# Patient Record
Sex: FEMALE | Race: WHITE | NOT HISPANIC OR LATINO | URBAN - METROPOLITAN AREA
[De-identification: names, ages, dates, MRNs, and addresses within clinical notes are randomized per-mention and may not be internally consistent; named-entity substitution may affect disease eponyms.]

---

## 2022-07-09 ENCOUNTER — TELEPHONE ENCOUNTER (OUTPATIENT)
Dept: URBAN - METROPOLITAN AREA CLINIC 121 | Facility: CLINIC | Age: 77
End: 2022-07-09

## 2022-07-10 ENCOUNTER — TELEPHONE ENCOUNTER (OUTPATIENT)
Dept: URBAN - METROPOLITAN AREA CLINIC 121 | Facility: CLINIC | Age: 77
End: 2022-07-10

## 2022-07-30 ENCOUNTER — TELEPHONE ENCOUNTER (OUTPATIENT)
Age: 77
End: 2022-07-30

## 2022-07-31 ENCOUNTER — TELEPHONE ENCOUNTER (OUTPATIENT)
Age: 77
End: 2022-07-31

## 2022-08-08 ENCOUNTER — OFFICE VISIT (OUTPATIENT)
Dept: URBAN - METROPOLITAN AREA CLINIC 63 | Facility: CLINIC | Age: 77
End: 2022-08-08
Payer: MEDICARE

## 2022-08-08 VITALS
WEIGHT: 172.8 LBS | OXYGEN SATURATION: 95 % | TEMPERATURE: 98.3 F | DIASTOLIC BLOOD PRESSURE: 58 MMHG | HEART RATE: 72 BPM | HEIGHT: 63 IN | SYSTOLIC BLOOD PRESSURE: 110 MMHG | BODY MASS INDEX: 30.62 KG/M2

## 2022-08-08 DIAGNOSIS — R10.84 GENERALIZED ABDOMINAL PAIN: ICD-10-CM

## 2022-08-08 DIAGNOSIS — R93.89 ABNORMAL ULTRASOUND: ICD-10-CM

## 2022-08-08 DIAGNOSIS — R19.4 CHANGE IN BOWEL HABITS: ICD-10-CM

## 2022-08-08 PROCEDURE — 99204 OFFICE O/P NEW MOD 45 MIN: CPT | Performed by: PHYSICIAN ASSISTANT

## 2022-08-08 RX ORDER — TEMAZEPAM 15 MG/1
1 CAPSULE AT BEDTIME AS NEEDED CAPSULE ORAL ONCE A DAY
Status: ACTIVE | COMMUNITY

## 2022-08-08 RX ORDER — HYDROCODONE BITARTRATE AND ACETAMINOPHEN 5; 325 MG/1; MG/1
1 TABLET AS NEEDED TABLET ORAL
Status: ACTIVE | COMMUNITY

## 2022-08-08 RX ORDER — HYDROXYCHLOROQUINE SULFATE 200 MG/1
AS DIRECTED TABLET, FILM COATED ORAL
Status: ACTIVE | COMMUNITY

## 2022-08-08 RX ORDER — FERROUS SULFATE TAB EC 325 MG (65 MG FE EQUIVALENT) 325 (65 FE) MG
RO TABLET DELAYED RESPONSE ORAL ONCE A DAY
Status: ACTIVE | COMMUNITY

## 2022-08-08 RX ORDER — METOPROLOL TARTRATE 25 MG/1
1 TABLET WITH FOOD TABLET, FILM COATED ORAL TWICE A DAY
Status: ACTIVE | COMMUNITY

## 2022-08-08 RX ORDER — VENLAFAXINE HYDROCHLORIDE 75 MG/1
1 TABLET WITH FOOD TABLET ORAL ONCE A DAY
Status: ACTIVE | COMMUNITY

## 2022-08-08 RX ORDER — ATORVASTATIN CALCIUM 80 MG/1
1 TABLET TABLET, FILM COATED ORAL ONCE A DAY
Status: ACTIVE | COMMUNITY

## 2022-08-08 RX ORDER — RISEDRONATE SODIUM 129; 21 MG/1; MG/1
1 TABLET AT LEAST 30 MINUTES BEFORE THE FIRST FOOD OR DRINK, OTHER THAN WATER, OF THE DAY TABLET, FILM COATED ORAL
Status: ACTIVE | COMMUNITY

## 2022-08-08 RX ORDER — SACUBITRIL AND VALSARTAN 24; 26 MG/1; MG/1
1 TABLET TABLET, FILM COATED ORAL TWICE A DAY
Status: ACTIVE | COMMUNITY

## 2022-08-08 RX ORDER — ALLOPURINOL 300 MG/1
1 TABLET TABLET ORAL ONCE A DAY
Status: ACTIVE | COMMUNITY

## 2022-08-08 RX ORDER — DIGOXIN 125 UG/1
1 TABLET TABLET ORAL
Status: ACTIVE | COMMUNITY

## 2022-08-08 RX ORDER — APIXABAN 5 MG/1
1 TABLET TABLET, FILM COATED ORAL TWICE A DAY
Status: ACTIVE | COMMUNITY

## 2022-08-08 RX ORDER — MOMETASONE FUROATE AND FORMOTEROL FUMARATE DIHYDRATE 200; 5 UG/1; UG/1
2 PUFFS AEROSOL RESPIRATORY (INHALATION) TWICE A DAY
Status: ACTIVE | COMMUNITY

## 2022-08-08 RX ORDER — PREGABALIN 25 MG/1
1 CAPSULE CAPSULE ORAL ONCE A DAY
Status: ACTIVE | COMMUNITY

## 2022-08-08 RX ORDER — FUROSEMIDE 40 MG/1
1 TABLET TABLET ORAL ONCE A DAY
Status: ACTIVE | COMMUNITY

## 2022-08-08 RX ORDER — LISINOPRIL 5 MG/1
1 TABLET TABLET ORAL ONCE A DAY
Status: ACTIVE | COMMUNITY

## 2022-08-08 RX ORDER — CALCITRIOL 0.25 UG/1
1 CAPSULE CAPSULE ORAL
Status: ACTIVE | COMMUNITY

## 2022-08-08 RX ORDER — ALBUTEROL SULFATE 90 UG/1
1 PUFF AS NEEDED AEROSOL, METERED RESPIRATORY (INHALATION)
Status: ACTIVE | COMMUNITY

## 2022-08-08 NOTE — PHYSICAL EXAM GASTROINTESTINAL
Abdomen , soft, nontender, nondistended , no guarding or rigidity , no masses palpable , normal bowel sounds , Liver and Spleen , no hepatomegaly present , no hepatosplenomegaly , liver nontender , spleen not palpable. Mid line surgical scar. ventral hernia soft, NT, reduces easily.

## 2022-08-08 NOTE — HPI-TODAY'S VISIT:
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   Since June 2022, she has had low abdominal pain and constipation. She is having a BM every 4 days on average. She states she takes colace and an OTC laxative after going 3 days without having a BM and  would then have a BM on the 4th day. This cycle has been repeating since then.  She states she has had frequent, intense cramping in the low abdomen. Cramping is relieved with defecation. Prior to June 2022, her bowel habits were regular.  She denies melena and hematochezia. She has nausea associated with her abdominal pain and vomits rarely. She uses pepcid complete twice a week which controls her nausea. She has no pyrosis, dysphagia or odynophagia. She reports a 10 pound weight loss unintentionally since her symptoms began in June 2022.   She had an MI in Feb 2000 and developed a staph infection which resulted in an intestinal perforation and had a partial colectomy with a colostomy that was later reversed. Colonoscopy was done prior to her ostomy reversal in 2000 and was normal according to her. She has not had a colonoscopy since then. She states she also had a liver abscess which was drained.   She had an abdominal sonogram done on 5/3/22 which showed gallstones, nodular liver contour, bilateral renal cysts, and soft tissue lesion in the LUQ, possible splenule. CT A/P was ordered but is not scheduled until Dec 2022. LFTs in April 2022 unremarkable with the exception of a mildly elevated ALP to 135. Platelets and albumin were normal. She has never been told she had any problems with her liver. She has never been a heavy drinker. She has no family history of liver disease.   She has a known ventral hernia in the RUQ and has been referred to general surgery.     Last colonoscopy was done

## 2022-08-09 PROBLEM — 169255008: Status: ACTIVE | Noted: 2022-08-09

## 2022-08-11 ENCOUNTER — TELEPHONE ENCOUNTER (OUTPATIENT)
Dept: URBAN - METROPOLITAN AREA CLINIC 63 | Facility: CLINIC | Age: 77
End: 2022-08-11

## 2022-09-14 ENCOUNTER — WEB ENCOUNTER (OUTPATIENT)
Dept: URBAN - METROPOLITAN AREA CLINIC 63 | Facility: CLINIC | Age: 77
End: 2022-09-14

## 2022-09-14 ENCOUNTER — DASHBOARD ENCOUNTERS (OUTPATIENT)
Age: 77
End: 2022-09-14

## 2022-09-14 ENCOUNTER — LAB OUTSIDE AN ENCOUNTER (OUTPATIENT)
Dept: URBAN - METROPOLITAN AREA CLINIC 63 | Facility: CLINIC | Age: 77
End: 2022-09-14

## 2022-09-14 ENCOUNTER — ERX REFILL RESPONSE (OUTPATIENT)
Dept: URBAN - METROPOLITAN AREA CLINIC 63 | Facility: CLINIC | Age: 77
End: 2022-09-14

## 2022-09-14 ENCOUNTER — OFFICE VISIT (OUTPATIENT)
Dept: URBAN - METROPOLITAN AREA CLINIC 63 | Facility: CLINIC | Age: 77
End: 2022-09-14
Payer: MEDICARE

## 2022-09-14 VITALS
HEIGHT: 63 IN | TEMPERATURE: 96.4 F | SYSTOLIC BLOOD PRESSURE: 110 MMHG | WEIGHT: 159 LBS | OXYGEN SATURATION: 98 % | BODY MASS INDEX: 28.17 KG/M2 | HEART RATE: 83 BPM | DIASTOLIC BLOOD PRESSURE: 70 MMHG

## 2022-09-14 DIAGNOSIS — R74.8 ABNORMAL LIVER ENZYMES: ICD-10-CM

## 2022-09-14 DIAGNOSIS — R19.8 CHANGE IN BOWEL FUNCTION: ICD-10-CM

## 2022-09-14 DIAGNOSIS — K83.8 COMMON BILE DUCT DILATATION: ICD-10-CM

## 2022-09-14 DIAGNOSIS — R10.84 GENERALIZED ABDOMINAL PAIN: ICD-10-CM

## 2022-09-14 DIAGNOSIS — K80.50 CHOLEDOCHOLITHIASIS: ICD-10-CM

## 2022-09-14 PROCEDURE — 99214 OFFICE O/P EST MOD 30 MIN: CPT | Performed by: INTERNAL MEDICINE

## 2022-09-14 RX ORDER — CALCITRIOL 0.25 UG/1
1 CAPSULE CAPSULE ORAL
Status: ACTIVE | COMMUNITY

## 2022-09-14 RX ORDER — DIGOXIN 125 UG/1
1 TABLET TABLET ORAL
Status: ACTIVE | COMMUNITY

## 2022-09-14 RX ORDER — TEMAZEPAM 15 MG/1
1 CAPSULE AT BEDTIME AS NEEDED CAPSULE ORAL ONCE A DAY
Status: ACTIVE | COMMUNITY

## 2022-09-14 RX ORDER — DICYCLOMINE HYDROCHLORIDE 20 MG/1
TAKE 1 TABLET BY MOUTH THREE TIMES DAILY AS NEEDED FOR PAIN TABLET ORAL
Qty: 270 TABLET | Refills: 0 | OUTPATIENT

## 2022-09-14 RX ORDER — HYDROXYCHLOROQUINE SULFATE 200 MG/1
AS DIRECTED TABLET, FILM COATED ORAL
Status: ACTIVE | COMMUNITY

## 2022-09-14 RX ORDER — APIXABAN 5 MG/1
1 TABLET TABLET, FILM COATED ORAL TWICE A DAY
Status: ACTIVE | COMMUNITY

## 2022-09-14 RX ORDER — DICYCLOMINE HYDROCHLORIDE 20 MG/1
1 TABLET TABLET ORAL
Qty: 90 | Refills: 1 | OUTPATIENT

## 2022-09-14 RX ORDER — PREGABALIN 25 MG/1
1 CAPSULE CAPSULE ORAL ONCE A DAY
Status: ACTIVE | COMMUNITY

## 2022-09-14 RX ORDER — FERROUS SULFATE TAB EC 325 MG (65 MG FE EQUIVALENT) 325 (65 FE) MG
RO TABLET DELAYED RESPONSE ORAL ONCE A DAY
Status: ACTIVE | COMMUNITY

## 2022-09-14 RX ORDER — MOMETASONE FUROATE AND FORMOTEROL FUMARATE DIHYDRATE 200; 5 UG/1; UG/1
2 PUFFS AEROSOL RESPIRATORY (INHALATION) TWICE A DAY
Status: ACTIVE | COMMUNITY

## 2022-09-14 RX ORDER — VENLAFAXINE HYDROCHLORIDE 75 MG/1
1 TABLET WITH FOOD TABLET ORAL ONCE A DAY
Status: ACTIVE | COMMUNITY

## 2022-09-14 RX ORDER — ALLOPURINOL 300 MG/1
1 TABLET TABLET ORAL ONCE A DAY
Status: ACTIVE | COMMUNITY

## 2022-09-14 RX ORDER — HYDROCODONE BITARTRATE AND ACETAMINOPHEN 5; 325 MG/1; MG/1
1 TABLET AS NEEDED TABLET ORAL
Status: ACTIVE | COMMUNITY

## 2022-09-14 RX ORDER — ALBUTEROL SULFATE 90 UG/1
1 PUFF AS NEEDED AEROSOL, METERED RESPIRATORY (INHALATION)
Status: ACTIVE | COMMUNITY

## 2022-09-14 RX ORDER — ONDANSETRON 8 MG/1
1 TABLET ON THE TONGUE AND ALLOW TO DISSOLVE  AS NEEDED TABLET, ORALLY DISINTEGRATING ORAL ONCE A DAY
Status: ACTIVE | COMMUNITY

## 2022-09-14 RX ORDER — FUROSEMIDE 40 MG/1
1 TABLET TABLET ORAL ONCE A DAY
Status: ACTIVE | COMMUNITY

## 2022-09-14 RX ORDER — ATORVASTATIN CALCIUM 80 MG/1
1 TABLET TABLET, FILM COATED ORAL ONCE A DAY
Status: ACTIVE | COMMUNITY

## 2022-09-14 RX ORDER — LISINOPRIL 5 MG/1
1 TABLET TABLET ORAL ONCE A DAY
Status: ACTIVE | COMMUNITY

## 2022-09-14 RX ORDER — RISEDRONATE SODIUM 129; 21 MG/1; MG/1
1 TABLET AT LEAST 30 MINUTES BEFORE THE FIRST FOOD OR DRINK, OTHER THAN WATER, OF THE DAY TABLET, FILM COATED ORAL
Status: ACTIVE | COMMUNITY

## 2022-09-14 RX ORDER — METOPROLOL TARTRATE 25 MG/1
1 TABLET WITH FOOD TABLET, FILM COATED ORAL TWICE A DAY
Status: ACTIVE | COMMUNITY

## 2022-09-14 RX ORDER — SACUBITRIL AND VALSARTAN 24; 26 MG/1; MG/1
1 TABLET TABLET, FILM COATED ORAL TWICE A DAY
Status: ACTIVE | COMMUNITY

## 2022-09-14 NOTE — HPI-TODAY'S VISIT:
Evelyn is a 77-year-old female who presents today for follow-up.  She was initially seen in our office last month 08/08/2022.  At that time she was complaining of abdominal pain and constipation.  Since her initial visit to our office, she was hospitalized from 08/18/20220/ to 20/2022.  Prior work-up has included ultrasound, CT scan, MRI/MRCP, and labs. Ultrasound/03/2022.  Slight nodular contour of the liver with question of underlying cirrhosis.  Cholelithiasis.  Common bile duct measuring 6 mm.  Hypoechoic lesion measuring 3.5 x 2.9 cm in the left upper quadrant, possible splenule.  CT abdomen and pelvis with contrast 08//2022.  Dilated loops of small bowel with findings of partial bowel obstruction or ileus.  Small volume ascites.  Distal common bile duct stone measuring 4 mm.  Dilated common bile duct measuring 13 mm.  Bladder wall thickening with possible cystitis.  CT abdomen pelvis with contrast 08/18/2022.  Cholelithiasis and choledocholithiasis with dilated common bile duct.  Prior splenectomy.  MRI/MRCP 08/19/2022.  Normal-appearing liver.  Moderate biliary dilation with the common bile duct measuring 12 mm.  No obvious choledocholithiasis.  The stone that was seen on the CT was not well seen on this examination.  Mild pancreatic duct dilation.  Mildly distended loops of bowel similar to prior CT.  Trace ascites. Labs prior to discharge on 8/20/2022.  Creatinine was mildly elevated at 1.20.  Liver enzymes were normal except for a mildly elevated alkaline phosphatase of 164. Past medical history significant for chronic kidney disease, coronary artery disease, CHF, COPD, fibromyalgia, and cervical cancer treated with chemotherapy and radiation. Overall, she is feeling better from her hospitalization.  She denies abdominal pain.  Denies nausea or vomiting.  Denies fever.  Denies weight loss.  She has complained of constipation in the past. However, her main complaint today is diarrhea.  Denies blood in the stool.

## 2022-09-18 PROBLEM — 123608004: Status: ACTIVE | Noted: 2022-09-18

## 2022-10-10 ENCOUNTER — TELEPHONE ENCOUNTER (OUTPATIENT)
Dept: URBAN - METROPOLITAN AREA CLINIC 63 | Facility: CLINIC | Age: 77
End: 2022-10-10

## 2022-10-10 ENCOUNTER — LAB OUTSIDE AN ENCOUNTER (OUTPATIENT)
Dept: URBAN - METROPOLITAN AREA CLINIC 63 | Facility: CLINIC | Age: 77
End: 2022-10-10

## 2022-10-21 ENCOUNTER — TELEPHONE ENCOUNTER (OUTPATIENT)
Dept: URBAN - METROPOLITAN AREA CLINIC 63 | Facility: CLINIC | Age: 77
End: 2022-10-21

## 2022-12-05 ENCOUNTER — OFFICE VISIT (OUTPATIENT)
Dept: URBAN - METROPOLITAN AREA MEDICAL CENTER 3 | Facility: MEDICAL CENTER | Age: 77
End: 2022-12-05